# Patient Record
Sex: FEMALE | URBAN - METROPOLITAN AREA
[De-identification: names, ages, dates, MRNs, and addresses within clinical notes are randomized per-mention and may not be internally consistent; named-entity substitution may affect disease eponyms.]

---

## 2020-09-18 ENCOUNTER — TELEPHONE (OUTPATIENT)
Dept: TRANSPLANT | Facility: CLINIC | Age: 65
End: 2020-09-18

## 2020-09-18 DIAGNOSIS — Z79.899 POLYPHARMACY: ICD-10-CM

## 2020-09-18 DIAGNOSIS — I27.9 CHRONIC PULMONARY HEART DISEASE: ICD-10-CM

## 2020-09-18 DIAGNOSIS — R06.82 TACHYPNEA: Primary | ICD-10-CM

## 2020-09-21 ENCOUNTER — TELEPHONE (OUTPATIENT)
Dept: TRANSPLANT | Facility: CLINIC | Age: 65
End: 2020-09-21

## 2020-09-21 NOTE — TELEPHONE ENCOUNTER
Pt has [place] Medicaid. Confirmed w/Markell T, that [facility] does not accept out of state Medicaid. Contacted Dr Chi's office, and informed of same.

## 2020-09-21 NOTE — TELEPHONE ENCOUNTER
Late entry for 9/18--Referral received from Dr Sarah Chi. Contacted office, w/request for additional records. Was informed that records sent were all that are available.